# Patient Record
Sex: MALE | Race: WHITE
[De-identification: names, ages, dates, MRNs, and addresses within clinical notes are randomized per-mention and may not be internally consistent; named-entity substitution may affect disease eponyms.]

---

## 2020-02-11 ENCOUNTER — HOSPITAL ENCOUNTER (OUTPATIENT)
Dept: HOSPITAL 76 - DI | Age: 35
Discharge: HOME | End: 2020-02-11
Attending: OTOLARYNGOLOGY
Payer: COMMERCIAL

## 2020-02-11 DIAGNOSIS — H90.42: ICD-10-CM

## 2020-02-11 DIAGNOSIS — H93.11: Primary | ICD-10-CM

## 2020-02-11 PROCEDURE — 93880 EXTRACRANIAL BILAT STUDY: CPT

## 2020-02-11 PROCEDURE — 70543 MRI ORBT/FAC/NCK W/O &W/DYE: CPT

## 2020-02-11 NOTE — ULTRASOUND REPORT
Reason:  RT EAR TINNITUS, LEFT EAR HEARING LOSS

Procedure Date:  02/11/2020   

Accession Number:  [id number] / [id number]                    

Procedure:  US  - Carotid Doppler Complete CPT Code:  

 

***Final Report***

 

 

FULL RESULT:

 

 

EXAM:

BILATERAL CAROTID AND VERTEBRAL ARTERY DUPLEX DOPPLER ULTRASOUND:

 

EXAM DATE: 2/11/2020 08:50 PM

 

CLINICAL HISTORY: Right ear tinnitus, left ear hearing loss.

 

COMPARISON: None.

 

TECHNIQUE: Grayscale imaging, color Doppler, and duplex spectral Doppler 

were used to evaluate the carotid and vertebral arteries bilaterally. 

Static images were obtained.

 

FINDINGS:  No significant plaque is identified in the right or left 

common or internal carotid arteries. Normal antegrade flow is present in 

bilateral vertebral arteries. Incidentally noted is a small 1 x 1.5 x 1.5 

cm simple appearing cyst below the left lobe of the thyroid near the 

thoracic inlet, almost certainly benign.

 

VELOCITIES (cm/sec):

 

Right

CCA mid:  cm/sec

CCA dist: PSV 82.0 cm/sec

ICA prox: PSV 53 cm/sec, EDV 20 cm/sec

ICA mid: PSV 72 cm/sec, EDV 24 cm/sec

ICA dist: PSV 72.5 cm/sec, EDV 22.4 cm/sec

ECA: .6 cm/sec

Vert: PSV 76 cm/sec

ICA/CCA: 0.6

 

Left

CCA mid:  cm/sec

CCA dist: PSV 86 cm/sec

ICA prox: PSV 72 cm/sec, EDV 19 cm/sec

ICA mid: PSV 84 cm/sec, EDV 22 cm/sec

ICA dist: PSV 72 cm/sec, EDV 20 cm/sec

ECA: PSV 98 cm/sec

Vert: PSV 36.4 cm/sec

ICA/CCA: 0.7

 

ICA diameter stenosis:

Right: Normal by velocity and <70% by NASCET criteria.

Left: Normal by velocity and <70% by NASCET criteria.

IMPRESSION:

1. No significant bilateral carotid artery plaquing.

2. In the right carotid artery there are no elevated carotid artery 

velocities to suggest hemodynamically significant stenosis.

3. In the left carotid artery there are no elevated carotid artery 

velocities to suggest hemodynamically significant stenosis.

4. Normal antegrade flow is present in bilateral vertebral arteries.

5. Incidentally noted is a small 1 x 1.5 x 1.5 cm simple appearing cyst 

below the left lobe of the thyroid near the thoracic inlet, almost 

certainly benign.

 

 

General Recommendations:

 

Stenosis =50% ICA - Follow-up ultrasound 6-12 months

Stenosis <50% ICA - High Risk Patient with plaque - Follow-up ultrasound 

1-2 years

Normal Study but High Risk Patient - Follow-up ultrasound 3-5 years

 

Management recommendations and diagnostic criteria are based on current 

IAC endorsed standards in Carotid Artery Stenosis: Grayscale and Doppler 

Ultrasound Diagnosis.

Validated velocity measurements with angiographic measurements and 

velocity criteria are extrapolated from diameter data as defined by the 

Society of Radiologists in Ultrasound Consensus Conference Radiology 

2003; 229;340-346.

 

[facility]

## 2020-02-12 NOTE — MRI REPORT
Reason:  RT EAR INNITUS, LEFT EAR HEARING LOSS

Procedure Date:  02/11/2020   

Accession Number:  [id number] / [id number]                    

Procedure:  MRI - IACS W/WO CPT Code:  

 

***Final Report***

 

 

FULL RESULT:

 

 

EXAM:

MRI BRAIN AND INTERNAL AUDITORY CANAL (IAC),WITHOUT AND WITH CONTRAST.

 

EXAM DATE: 02/11/2020 07:24 PM.

 

CLINICAL HISTORY: Right ear tinnitus, left ear hearing loss.

 

COMPARISON: None.

 

TECHNIQUE: Multiplanar, multisequence T1-weighted and fluid-sensitive MRI 

sequences of the brain and IACs were performed before and after 

administration of intravenous contrast. Other: None. IV Contrast: 9 mL of 

Gadavist.

 

FINDINGS:

Brain Volume: Normal for age.

 

Parenchyma/Dura: No acute hemorrhage, mass, or acute infarct.No white 

matter lesions identified.No abnormal enhancement.

 

Internal Auditory Canals (IACs): The high-resolution images through the 

posterior fossa demonstrate normal fluid signal intensity in the cochlea 

and semicircular canals bilaterally.There is no evidence of dehiscence of 

the semicircular canals. No abnormal mass lesion or enhancement is seen 

along the cisternal course of the fifth, seventh, and eighth cranial 

nerves. The vestibular aqueducts are not enlarged.

 

Ventricles/Cisterns: No hydrocephalus.No abnormal extra-axial fluid 

collection or hemorrhage.

 

Orbits: Symmetric and unremarkable.

 

Sella Turcica: The pituitary gland, cavernous sinuses, suprasellar 

cistern and optic chiasm are unremarkable.

 

Vasculature: Normal signal flow void is seen in the major arterial 

structures at the skull base.The dural sinuses are patent and enhance 

normally.

 

Sinuses: Small bilateral mastoid effusions are present. There is mild 

mucosal thickening in the ethmoid sinuses, greater on the right. Several 

mucous retention cysts are also noted in the maxillary sinuses.

 

Bones: No focal pathologic appearing marrow signal changes.

IMPRESSION:

1. No acute infarct, intracranial mass lesion, or hemorrhage.

2. No abnormal enhancement or mass lesion in the posterior fossa or IACs.

3. Mild bilateral mastoid effusions are noted.

 

[facility]